# Patient Record
Sex: MALE | Race: WHITE
[De-identification: names, ages, dates, MRNs, and addresses within clinical notes are randomized per-mention and may not be internally consistent; named-entity substitution may affect disease eponyms.]

---

## 2019-02-24 ENCOUNTER — HOSPITAL ENCOUNTER (INPATIENT)
Dept: HOSPITAL 94 - ER | Age: 34
LOS: 8 days | Discharge: HOME | End: 2019-03-04
Payer: MEDICAID

## 2019-02-24 DIAGNOSIS — T40.1X1A: ICD-10-CM

## 2019-02-24 DIAGNOSIS — I46.9: ICD-10-CM

## 2019-02-24 DIAGNOSIS — J69.0: ICD-10-CM

## 2019-02-24 DIAGNOSIS — J96.00: Primary | ICD-10-CM

## 2019-02-24 DIAGNOSIS — I47.2: ICD-10-CM

## 2019-02-24 DIAGNOSIS — J81.0: ICD-10-CM

## 2022-05-29 ENCOUNTER — HOSPITAL ENCOUNTER (EMERGENCY)
Dept: HOSPITAL 94 - ER | Age: 37
Discharge: HOME | End: 2022-05-29
Payer: MEDICAID

## 2022-05-29 VITALS — BODY MASS INDEX: 30.3 KG/M2 | WEIGHT: 204.59 LBS | HEIGHT: 69 IN

## 2022-05-29 VITALS — DIASTOLIC BLOOD PRESSURE: 98 MMHG | SYSTOLIC BLOOD PRESSURE: 139 MMHG

## 2022-05-29 DIAGNOSIS — F14.90: ICD-10-CM

## 2022-05-29 DIAGNOSIS — Z79.899: ICD-10-CM

## 2022-05-29 DIAGNOSIS — Z59.00: ICD-10-CM

## 2022-05-29 DIAGNOSIS — Z72.89: ICD-10-CM

## 2022-05-29 DIAGNOSIS — L03.116: Primary | ICD-10-CM

## 2022-05-29 PROCEDURE — 99284 EMERGENCY DEPT VISIT MOD MDM: CPT

## 2022-05-29 SDOH — ECONOMIC STABILITY - HOUSING INSECURITY: HOMELESSNESS UNSPECIFIED: Z59.00

## 2022-09-20 ENCOUNTER — HOSPITAL ENCOUNTER (EMERGENCY)
Dept: HOSPITAL 94 - ER | Age: 37
Discharge: HOME | End: 2022-09-20
Payer: MEDICAID

## 2022-09-20 VITALS — HEIGHT: 69 IN | BODY MASS INDEX: 29.89 KG/M2 | WEIGHT: 201.83 LBS

## 2022-09-20 VITALS — DIASTOLIC BLOOD PRESSURE: 86 MMHG | SYSTOLIC BLOOD PRESSURE: 122 MMHG
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
## 2022-09-20 DIAGNOSIS — Z59.00: ICD-10-CM

## 2022-09-20 DIAGNOSIS — F11.90: ICD-10-CM
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
## 2022-09-20 DIAGNOSIS — Z72.89: ICD-10-CM

## 2022-09-20 DIAGNOSIS — Z79.2: ICD-10-CM
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
## 2022-09-20 DIAGNOSIS — S01.511A: Primary | ICD-10-CM

## 2022-09-20 PROCEDURE — 12011 RPR F/E/E/N/L/M 2.5 CM/<: CPT

## 2022-09-20 PROCEDURE — 99283 EMERGENCY DEPT VISIT LOW MDM: CPT

## 2022-09-20 SDOH — ECONOMIC STABILITY - HOUSING INSECURITY: HOMELESSNESS UNSPECIFIED: Z59.00

## 2022-09-20 NOTE — NUR
TOOK ADVIL @0000 3 CM LAC TO LEFT FACE UPPER LIP AREA , THROUGH AND THROUGH, 
ECCHYMOSIS AROUND SITE EDGES APPROXIMATE WELL NON DISTRESSFUL

## 2022-09-27 ENCOUNTER — HOSPITAL ENCOUNTER (EMERGENCY)
Dept: HOSPITAL 94 - ER | Age: 37
Discharge: HOME | End: 2022-09-27
Payer: MEDICAID

## 2022-09-27 VITALS — DIASTOLIC BLOOD PRESSURE: 95 MMHG | SYSTOLIC BLOOD PRESSURE: 127 MMHG

## 2022-09-27 VITALS — HEIGHT: 69 IN | WEIGHT: 200.42 LBS | BODY MASS INDEX: 29.68 KG/M2
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
## 2022-09-27 DIAGNOSIS — Z48.00: ICD-10-CM

## 2022-09-27 DIAGNOSIS — S01.511D: Primary | ICD-10-CM

## 2022-09-27 PROCEDURE — 99281 EMR DPT VST MAYX REQ PHY/QHP: CPT

## (undated) PROCEDURE — 5A1945Z RESPIRATORY VENTILATION, 24-96 CONSECUTIVE HOURS: ICD-10-PCS

## (undated) PROCEDURE — 0BH17EZ INSERTION OF ENDOTRACHEAL AIRWAY INTO TRACHEA, VIA NATURAL OR ARTIFICIAL OPENING: ICD-10-PCS